# Patient Record
Sex: MALE | Race: WHITE | NOT HISPANIC OR LATINO | Employment: FULL TIME | URBAN - METROPOLITAN AREA
[De-identification: names, ages, dates, MRNs, and addresses within clinical notes are randomized per-mention and may not be internally consistent; named-entity substitution may affect disease eponyms.]

---

## 2024-03-15 ENCOUNTER — LAB (OUTPATIENT)
Dept: LAB | Facility: LAB | Age: 44
End: 2024-03-15
Payer: COMMERCIAL

## 2024-03-15 ENCOUNTER — OFFICE VISIT (OUTPATIENT)
Dept: PRIMARY CARE | Facility: CLINIC | Age: 44
End: 2024-03-15
Payer: COMMERCIAL

## 2024-03-15 VITALS
HEIGHT: 73 IN | SYSTOLIC BLOOD PRESSURE: 152 MMHG | BODY MASS INDEX: 30.29 KG/M2 | DIASTOLIC BLOOD PRESSURE: 91 MMHG | OXYGEN SATURATION: 98 % | HEART RATE: 62 BPM

## 2024-03-15 DIAGNOSIS — Z00.00 ROUTINE GENERAL MEDICAL EXAMINATION AT A HEALTH CARE FACILITY: ICD-10-CM

## 2024-03-15 DIAGNOSIS — Z12.11 COLON CANCER SCREENING: Primary | ICD-10-CM

## 2024-03-15 LAB
25(OH)D3 SERPL-MCNC: 16 NG/ML (ref 30–100)
ALBUMIN SERPL BCP-MCNC: 4.4 G/DL (ref 3.4–5)
ALP SERPL-CCNC: 38 U/L (ref 33–120)
ALT SERPL W P-5'-P-CCNC: 54 U/L (ref 10–52)
ANION GAP SERPL CALC-SCNC: 11 MMOL/L (ref 10–20)
AST SERPL W P-5'-P-CCNC: 42 U/L (ref 9–39)
BASOPHILS # BLD AUTO: 0.05 X10*3/UL (ref 0–0.1)
BASOPHILS NFR BLD AUTO: 0.8 %
BILIRUB SERPL-MCNC: 0.4 MG/DL (ref 0–1.2)
BUN SERPL-MCNC: 19 MG/DL (ref 6–23)
CALCIUM SERPL-MCNC: 9.7 MG/DL (ref 8.6–10.6)
CHLORIDE SERPL-SCNC: 103 MMOL/L (ref 98–107)
CHOLEST SERPL-MCNC: 177 MG/DL (ref 0–199)
CHOLESTEROL/HDL RATIO: 4.1
CO2 SERPL-SCNC: 27 MMOL/L (ref 21–32)
CREAT SERPL-MCNC: 0.8 MG/DL (ref 0.5–1.3)
EGFRCR SERPLBLD CKD-EPI 2021: >90 ML/MIN/1.73M*2
EOSINOPHIL # BLD AUTO: 0.17 X10*3/UL (ref 0–0.7)
EOSINOPHIL NFR BLD AUTO: 2.9 %
ERYTHROCYTE [DISTWIDTH] IN BLOOD BY AUTOMATED COUNT: 13.2 % (ref 11.5–14.5)
EST. AVERAGE GLUCOSE BLD GHB EST-MCNC: 105 MG/DL
GLUCOSE SERPL-MCNC: 92 MG/DL (ref 74–99)
HBA1C MFR BLD: 5.3 %
HCT VFR BLD AUTO: 46.2 % (ref 41–52)
HDLC SERPL-MCNC: 43.7 MG/DL
HGB BLD-MCNC: 15.6 G/DL (ref 13.5–17.5)
IMM GRANULOCYTES # BLD AUTO: 0.02 X10*3/UL (ref 0–0.7)
IMM GRANULOCYTES NFR BLD AUTO: 0.3 % (ref 0–0.9)
LDLC SERPL CALC-MCNC: 83 MG/DL
LYMPHOCYTES # BLD AUTO: 2.02 X10*3/UL (ref 1.2–4.8)
LYMPHOCYTES NFR BLD AUTO: 34.1 %
MCH RBC QN AUTO: 30.6 PG (ref 26–34)
MCHC RBC AUTO-ENTMCNC: 33.8 G/DL (ref 32–36)
MCV RBC AUTO: 91 FL (ref 80–100)
MONOCYTES # BLD AUTO: 0.55 X10*3/UL (ref 0.1–1)
MONOCYTES NFR BLD AUTO: 9.3 %
NEUTROPHILS # BLD AUTO: 3.11 X10*3/UL (ref 1.2–7.7)
NEUTROPHILS NFR BLD AUTO: 52.6 %
NON HDL CHOLESTEROL: 133 MG/DL (ref 0–149)
NRBC BLD-RTO: 0 /100 WBCS (ref 0–0)
PLATELET # BLD AUTO: 240 X10*3/UL (ref 150–450)
POTASSIUM SERPL-SCNC: 3.9 MMOL/L (ref 3.5–5.3)
PROT SERPL-MCNC: 6.9 G/DL (ref 6.4–8.2)
RBC # BLD AUTO: 5.1 X10*6/UL (ref 4.5–5.9)
SODIUM SERPL-SCNC: 137 MMOL/L (ref 136–145)
TRIGL SERPL-MCNC: 250 MG/DL (ref 0–149)
TSH SERPL-ACNC: 0.93 MIU/L (ref 0.44–3.98)
VLDL: 50 MG/DL (ref 0–40)
WBC # BLD AUTO: 5.9 X10*3/UL (ref 4.4–11.3)

## 2024-03-15 PROCEDURE — 85025 COMPLETE CBC W/AUTO DIFF WBC: CPT

## 2024-03-15 PROCEDURE — 84153 ASSAY OF PSA TOTAL: CPT

## 2024-03-15 PROCEDURE — 82306 VITAMIN D 25 HYDROXY: CPT

## 2024-03-15 PROCEDURE — 83036 HEMOGLOBIN GLYCOSYLATED A1C: CPT

## 2024-03-15 PROCEDURE — 84443 ASSAY THYROID STIM HORMONE: CPT

## 2024-03-15 PROCEDURE — 84154 ASSAY OF PSA FREE: CPT

## 2024-03-15 PROCEDURE — 80053 COMPREHEN METABOLIC PANEL: CPT

## 2024-03-15 PROCEDURE — 80061 LIPID PANEL: CPT

## 2024-03-15 PROCEDURE — 36415 COLL VENOUS BLD VENIPUNCTURE: CPT

## 2024-03-15 PROCEDURE — 99396 PREV VISIT EST AGE 40-64: CPT | Performed by: INTERNAL MEDICINE

## 2024-03-15 NOTE — PROGRESS NOTES
Andrew Berg is a 44 yo M presenting for wellness visit.     PreHTN, elevated BP today - urged home BP checking. Suspect may have HTN. Likely new start antihypertensive pending home readings.   DLD, low ASCVD   Overweight   L hydrocele s/p repair       #HM   -cscope due   -due labs       Gen Aox3 NAD well appearing   HEENT mmm pharynx clear no cervical LAD   Eyes sclerae clear   CV rrr nl s1/2 no m/r/g  Pulm CTAB no adventitious sounds   Ext warm dry no edema 2+ DP pulse         Andrew Berg is a 44 yo M presenting for wellness visit.     PreHTN, elevated BP today - urged home BP checking. Suspect may have HTN. Likely new start antihypertensive pending home readings.   DLD, low ASCVD   Overweight   L hydrocele s/p repair       #HM   -cscope due   -due labs

## 2024-03-15 NOTE — PATIENT INSTRUCTIONS
Andrew,     We are doing full labs today   You will be called to schedule colonoscopy this summer. Ask for Clenpiq prep!   Check your blood pressure a couple times a week in the morning; if consistently 135+ ont he top or 85+ on the bottom, call or MyChart message me (registration link on this handout!) and we would start an easy once a day blood pressure pill one tab daily at bedtime - not a big deal. Give me a message in 1-2 weeks.     CL

## 2024-03-18 LAB
PSA FREE MFR SERPL: 67 %
PSA FREE SERPL-MCNC: 0.2 NG/ML
PSA SERPL IA-MCNC: 0.3 NG/ML (ref 0–4)

## 2024-04-16 DIAGNOSIS — I10 BENIGN ESSENTIAL HYPERTENSION: Primary | ICD-10-CM

## 2024-04-16 RX ORDER — LOSARTAN POTASSIUM 50 MG/1
50 TABLET ORAL DAILY
Qty: 30 TABLET | Refills: 0 | Status: SHIPPED | OUTPATIENT
Start: 2024-04-16 | End: 2024-05-13 | Stop reason: SDUPTHER

## 2024-05-12 NOTE — PROGRESS NOTES
Subjective   Patient ID: Brian Berg is a 43 y.o. male who presents for FUV.  HPI    Patient reports that he has been feeling well since his last visit.  He is started taking his losartan 50 mg a day, and has been taking it every day.  He has not noticed any side effects.  He is experienced any headaches, vision changes (apart from new prescription), chest pain, palpitations, edema or other concerns.  His blood pressure home cuff is not working, but he will try to replace it.  He follows a vegetarian diet, and DASH diet was discussed.  He is very active and runs a farm.  He does not have any other concerns at this time.  PMH:   Past Medical History:   Diagnosis Date    Other recurrent depressive disorders (CMS-[facility]) 12/02/2014    Seasonal affective disorder      PSH:   Past Surgical History:   Procedure Laterality Date    OTHER SURGICAL HISTORY  12/02/2014    Closed Treat Fractured Finger Middle Phalanx, Manipulation      Allergies: Not on File   FH: family history is not on file.   Social Hx:   Social Determinants of Health     Tobacco Use: Not on file   Alcohol Use: Not on file   Financial Resource Strain: Not on file   Food Insecurity: Not on file   Transportation Needs: Not on file   Physical Activity: Not on file   Stress: Not on file   Social Connections: Not on file   Intimate Partner Violence: Not on file   Depression: Not on file   Housing Stability: Not on file   Utilities: Not on file   Digital Equity: Not on file   Health Literacy: Not on file        Review of Systems: All other ROS negative except as above.    Objective   Visit Vitals  /82   Pulse 80   Resp 20      Physical Exam  Constitutional: awake and alert, resting comfortably in NAD, alert and oriented x3  Eyes: No scleral icterus or conjunctival injection, EOM grossly intact  ENMT: MMM  Head/Neck: NC/AT  Respiratory/Thorax: Breathing comfortably. No retractions or accessory muscle use. Good air entry into all lung fields. CTAB, no  "wheezes, rales, rhonchi or crackles  Cardiovascular: RRR, +S1, S2, no m/r/g  Extremities: warm and well perfused, no LE edema, 2+ radial and dorsalis pedis pulses b/l, capillary refill <2s  Neurological: motor and sensation grossly intact and symmetric  Psychological: Mood and affect appropriate for age     Current Outpatient Medications   Medication Instructions    losartan (COZAAR) 50 mg, oral, Daily         No lab exists for component: \"CBC\", \"RFP\", \"CMP\", \"VITAMIN D\"     Assessment/Plan   Problem List Items Addressed This Visit    None      Brian Berg is a 43 y.o. male who presents for FUV and BP check. His BP has significantly improved from 150s/90s to 128/82 today on losartan. He is tolerating medication well with no significant side effects. He was encouraged to continue with adherence, and additional measures including DASH diet were discussed.    PreHTN, elevated BP  -c/w Losartan 50mg daily  -encouraged to obtain new home BP cuff and monitor  -DASH diet  -remain active    DLD, low ASCVD   Overweight   L hydrocele s/p repair         #HM   -cscope due   -due labs     The patient was seen and discussed with Attending Physician, Dr. Sim.    Priyanka Marquis MD, MPH  Internal Medicine-Pediatrics, PGY-2  [facility]  [facility]  Please note that voice recognition software was used to dictate this note. Please excuse any errors.   "

## 2024-05-13 ENCOUNTER — OFFICE VISIT (OUTPATIENT)
Dept: PRIMARY CARE | Facility: CLINIC | Age: 44
End: 2024-05-13
Payer: COMMERCIAL

## 2024-05-13 VITALS — HEART RATE: 80 BPM | DIASTOLIC BLOOD PRESSURE: 82 MMHG | RESPIRATION RATE: 20 BRPM | SYSTOLIC BLOOD PRESSURE: 128 MMHG

## 2024-05-13 DIAGNOSIS — L20.9 ATOPIC DERMATITIS, UNSPECIFIED TYPE: Primary | ICD-10-CM

## 2024-05-13 DIAGNOSIS — I10 BENIGN ESSENTIAL HYPERTENSION: ICD-10-CM

## 2024-05-13 PROCEDURE — 3074F SYST BP LT 130 MM HG: CPT | Performed by: INTERNAL MEDICINE

## 2024-05-13 PROCEDURE — 99213 OFFICE O/P EST LOW 20 MIN: CPT | Performed by: INTERNAL MEDICINE

## 2024-05-13 PROCEDURE — 3079F DIAST BP 80-89 MM HG: CPT | Performed by: INTERNAL MEDICINE

## 2024-05-13 RX ORDER — LOSARTAN POTASSIUM 50 MG/1
50 TABLET ORAL DAILY
Qty: 90 TABLET | Refills: 3 | Status: SHIPPED | OUTPATIENT
Start: 2024-05-13 | End: 2025-05-13

## 2024-05-13 RX ORDER — CLOBETASOL PROPIONATE 0.5 MG/G
CREAM TOPICAL 2 TIMES DAILY
Qty: 60 G | Refills: 2 | Status: SHIPPED | OUTPATIENT
Start: 2024-05-13 | End: 2025-05-13

## 2024-06-19 ENCOUNTER — HOSPITAL ENCOUNTER (OUTPATIENT)
Dept: GASTROENTEROLOGY | Facility: HOSPITAL | Age: 44
Setting detail: OUTPATIENT SURGERY
Discharge: HOME | End: 2024-06-19
Payer: COMMERCIAL

## 2024-06-19 VITALS
DIASTOLIC BLOOD PRESSURE: 81 MMHG | HEART RATE: 60 BPM | WEIGHT: 225 LBS | BODY MASS INDEX: 29.82 KG/M2 | SYSTOLIC BLOOD PRESSURE: 117 MMHG | RESPIRATION RATE: 18 BRPM | OXYGEN SATURATION: 97 % | TEMPERATURE: 98 F | HEIGHT: 73 IN

## 2024-06-19 DIAGNOSIS — Z12.11 COLON CANCER SCREENING: Primary | ICD-10-CM

## 2024-06-19 PROCEDURE — 45378 DIAGNOSTIC COLONOSCOPY: CPT | Performed by: STUDENT IN AN ORGANIZED HEALTH CARE EDUCATION/TRAINING PROGRAM

## 2024-06-19 PROCEDURE — 7100000009 HC PHASE TWO TIME - INITIAL BASE CHARGE

## 2024-06-19 PROCEDURE — 99152 MOD SED SAME PHYS/QHP 5/>YRS: CPT | Performed by: STUDENT IN AN ORGANIZED HEALTH CARE EDUCATION/TRAINING PROGRAM

## 2024-06-19 PROCEDURE — 2500000004 HC RX 250 GENERAL PHARMACY W/ HCPCS (ALT 636 FOR OP/ED): Performed by: STUDENT IN AN ORGANIZED HEALTH CARE EDUCATION/TRAINING PROGRAM

## 2024-06-19 PROCEDURE — 3700000012 HC SEDATION LEVEL 5+ TIME - INITIAL 15 MINUTES 5/> YEARS

## 2024-06-19 PROCEDURE — 7100000010 HC PHASE TWO TIME - EACH INCREMENTAL 1 MINUTE

## 2024-06-19 RX ORDER — FENTANYL CITRATE 50 UG/ML
INJECTION, SOLUTION INTRAMUSCULAR; INTRAVENOUS AS NEEDED
Status: COMPLETED | OUTPATIENT
Start: 2024-06-19 | End: 2024-06-19

## 2024-06-19 RX ORDER — MIDAZOLAM HYDROCHLORIDE 1 MG/ML
INJECTION, SOLUTION INTRAMUSCULAR; INTRAVENOUS AS NEEDED
Status: COMPLETED | OUTPATIENT
Start: 2024-06-19 | End: 2024-06-19

## 2024-06-19 RX ORDER — SODIUM CHLORIDE, SODIUM LACTATE, POTASSIUM CHLORIDE, CALCIUM CHLORIDE 600; 310; 30; 20 MG/100ML; MG/100ML; MG/100ML; MG/100ML
20 INJECTION, SOLUTION INTRAVENOUS CONTINUOUS
Status: DISCONTINUED | OUTPATIENT
Start: 2024-06-19 | End: 2024-06-20 | Stop reason: HOSPADM

## 2024-06-19 ASSESSMENT — PAIN SCALES - GENERAL
PAINLEVEL_OUTOF10: 0 - NO PAIN

## 2024-06-19 ASSESSMENT — PAIN - FUNCTIONAL ASSESSMENT
PAIN_FUNCTIONAL_ASSESSMENT: 0-10

## 2024-06-19 ASSESSMENT — ENCOUNTER SYMPTOMS
DEPRESSION: 0
LOSS OF SENSATION IN FEET: 0
OCCASIONAL FEELINGS OF UNSTEADINESS: 0

## 2024-06-19 ASSESSMENT — COLUMBIA-SUICIDE SEVERITY RATING SCALE - C-SSRS
6. HAVE YOU EVER DONE ANYTHING, STARTED TO DO ANYTHING, OR PREPARED TO DO ANYTHING TO END YOUR LIFE?: NO
1. IN THE PAST MONTH, HAVE YOU WISHED YOU WERE DEAD OR WISHED YOU COULD GO TO SLEEP AND NOT WAKE UP?: NO
2. HAVE YOU ACTUALLY HAD ANY THOUGHTS OF KILLING YOURSELF?: NO

## 2024-06-19 NOTE — H&P
"History Of Present Illness  Brian Berg is a 44 y.o. male presenting for a colonoscopy for CRC screening.      Past Medical History  Past Medical History:   Diagnosis Date    Hypertension     Other recurrent depressive disorders (CMS-[facility]) 12/02/2014    Seasonal affective disorder     Surgical History  Past Surgical History:   Procedure Laterality Date    OTHER SURGICAL HISTORY  12/02/2014    Closed Treat Fractured Finger Middle Phalanx, Manipulation     Social History  He reports that he has never smoked. He has never used smokeless tobacco. He reports current alcohol use. He reports current drug use. Drug: Marijuana.    Family History  No family history on file.     Allergies  Allergies   Allergen Reactions    Codeine Nausea/vomiting     Review of Systems  Constitutional/ General: No fever  Eyes: no yellow discoloration  ENT: normal   Cardiovascular: no chest pain   Respiratory: no shortness of breath  Gastrointestinal: denies abdominal pain   Integumentary: no yellow discoloration of skin  Neurologic: no weakness of extremities  Genitourinary: no hematuria    All other systems have been reviewed and are negative except as noted in the HPI and above.    Pre-sedation Evaluation:  ASA Classification - ASA 2 - Patient with mild systemic disease with no functional limitations  Mallampati Score - II (hard and soft palate, upper portion of tonsils and uvula visible)    Physical Exam  Constitutional: awake, alert, in no acute distress  Eyes: EOMI   ENT: no oropharyngeal lesions visualized  Respiratory: Bilateral air entry equal   Cardiovascular: regular rate and rhythm, no lower extremity edema  Abdomen: soft, non tender, non distended    Neurologic: gross motor strength intact   Psychiatric: alert, appropriate mood and affect, oriented to time/place/person    Last Recorded Vitals  Height 1.854 m (6' 1\"), weight 102 kg (225 lb).     Assessment/Plan   Plan for colonoscopy today. Will review procedure details, potential " risks and obtain informed consent.        PTA/Current Medications:  (Not in a hospital admission)    Current Outpatient Medications   Medication Sig Dispense Refill    clobetasol (Temovate) 0.05 % cream Apply topically 2 times a day. 60 g 2    losartan (Cozaar) 50 mg tablet Take 1 tablet (50 mg) by mouth once daily. 90 tablet 3     Current Facility-Administered Medications   Medication Dose Route Frequency Provider Last Rate Last Admin    lactated Ringer's infusion  20 mL/hr intravenous Continuous MD Zuly Lorenzo MD

## 2024-06-20 ASSESSMENT — PAIN SCALES - GENERAL: PAINLEVEL_OUTOF10: 0 - NO PAIN

## 2024-12-12 ENCOUNTER — APPOINTMENT (OUTPATIENT)
Dept: PRIMARY CARE | Facility: CLINIC | Age: 44
End: 2024-12-12
Payer: COMMERCIAL

## 2024-12-12 ENCOUNTER — LAB (OUTPATIENT)
Dept: LAB | Facility: LAB | Age: 44
End: 2024-12-12
Payer: COMMERCIAL

## 2024-12-12 VITALS — SYSTOLIC BLOOD PRESSURE: 145 MMHG | BODY MASS INDEX: 30.74 KG/M2 | WEIGHT: 233 LBS | DIASTOLIC BLOOD PRESSURE: 80 MMHG

## 2024-12-12 DIAGNOSIS — L20.9 ATOPIC DERMATITIS, UNSPECIFIED TYPE: ICD-10-CM

## 2024-12-12 DIAGNOSIS — Z00.00 HEALTH CARE MAINTENANCE: Primary | ICD-10-CM

## 2024-12-12 DIAGNOSIS — I10 BENIGN ESSENTIAL HYPERTENSION: ICD-10-CM

## 2024-12-12 DIAGNOSIS — Z00.00 HEALTH CARE MAINTENANCE: ICD-10-CM

## 2024-12-12 LAB
ALBUMIN SERPL BCP-MCNC: 4.2 G/DL (ref 3.4–5)
ALP SERPL-CCNC: 41 U/L (ref 33–120)
ALT SERPL W P-5'-P-CCNC: 39 U/L (ref 10–52)
ANION GAP SERPL CALC-SCNC: 11 MMOL/L (ref 10–20)
AST SERPL W P-5'-P-CCNC: 26 U/L (ref 9–39)
BILIRUB SERPL-MCNC: 0.5 MG/DL (ref 0–1.2)
BUN SERPL-MCNC: 24 MG/DL (ref 6–23)
CALCIUM SERPL-MCNC: 9.9 MG/DL (ref 8.6–10.6)
CHLORIDE SERPL-SCNC: 104 MMOL/L (ref 98–107)
CHOLEST SERPL-MCNC: 155 MG/DL (ref 0–199)
CHOLESTEROL/HDL RATIO: 3.5
CO2 SERPL-SCNC: 27 MMOL/L (ref 21–32)
CREAT SERPL-MCNC: 0.85 MG/DL (ref 0.5–1.3)
EGFRCR SERPLBLD CKD-EPI 2021: >90 ML/MIN/1.73M*2
ERYTHROCYTE [DISTWIDTH] IN BLOOD BY AUTOMATED COUNT: 13.2 % (ref 11.5–14.5)
GLUCOSE SERPL-MCNC: 96 MG/DL (ref 74–99)
HCT VFR BLD AUTO: 45.6 % (ref 41–52)
HDLC SERPL-MCNC: 43.8 MG/DL
HGB BLD-MCNC: 15.1 G/DL (ref 13.5–17.5)
LDLC SERPL CALC-MCNC: 73 MG/DL
MCH RBC QN AUTO: 30 PG (ref 26–34)
MCHC RBC AUTO-ENTMCNC: 33.1 G/DL (ref 32–36)
MCV RBC AUTO: 91 FL (ref 80–100)
NON HDL CHOLESTEROL: 111 MG/DL (ref 0–149)
NRBC BLD-RTO: 0 /100 WBCS (ref 0–0)
PLATELET # BLD AUTO: 277 X10*3/UL (ref 150–450)
POTASSIUM SERPL-SCNC: 4.2 MMOL/L (ref 3.5–5.3)
PROT SERPL-MCNC: 7.2 G/DL (ref 6.4–8.2)
RBC # BLD AUTO: 5.03 X10*6/UL (ref 4.5–5.9)
SODIUM SERPL-SCNC: 138 MMOL/L (ref 136–145)
TRIGL SERPL-MCNC: 192 MG/DL (ref 0–149)
VLDL: 38 MG/DL (ref 0–40)
WBC # BLD AUTO: 7.3 X10*3/UL (ref 4.4–11.3)

## 2024-12-12 PROCEDURE — 85027 COMPLETE CBC AUTOMATED: CPT

## 2024-12-12 PROCEDURE — 3077F SYST BP >= 140 MM HG: CPT | Performed by: INTERNAL MEDICINE

## 2024-12-12 PROCEDURE — 3079F DIAST BP 80-89 MM HG: CPT | Performed by: INTERNAL MEDICINE

## 2024-12-12 PROCEDURE — 80061 LIPID PANEL: CPT

## 2024-12-12 PROCEDURE — 80053 COMPREHEN METABOLIC PANEL: CPT

## 2024-12-12 PROCEDURE — 36415 COLL VENOUS BLD VENIPUNCTURE: CPT

## 2024-12-12 PROCEDURE — 99396 PREV VISIT EST AGE 40-64: CPT | Performed by: INTERNAL MEDICINE

## 2024-12-12 RX ORDER — LOSARTAN POTASSIUM 100 MG/1
100 TABLET ORAL DAILY
Qty: 100 TABLET | Refills: 1 | Status: SHIPPED | OUTPATIENT
Start: 2024-12-12 | End: 2026-01-16

## 2024-12-12 NOTE — PROGRESS NOTES
Subjective   Patient ID: Brian Berg is a 44 y.o. male who presents for No chief complaint on file..    HPI patient for first time septated Franchi 8 former Dr. High patient home blood pressures have been crusting in the 150s to 160 gio the diastolic numbers have been in the mid 70s to 80 gio no clinical symptoms no chest pain no shortness of breath no leg swelling no palpitations bowels normal no dysuria not exercising much but is walking    Past medical history hypertension atopic dermatitis    Medications losartan 50 mg steroid cream    Allergies noted and unchanged see EMR    Social history essentially no tobacco alcohol 10-15 drinks per week caffeine 3 to 5/day    2 father ASCVD TIA hypertension hyperlipidemia    Prevention walks for exercise some prior blood work reviewed weight has been stable  Review of Systems    Objective   There were no vitals taken for this visit.    Physical Exam  Vital signs noted alert and oriented x 3 NCAT perrla eomi nares s dc op benign no ac nodes no thyromegaly no JVD or bruit chest clear to auscultation and percussion CV regular rate and rhythm S1-S2 without murmur gallop or rub abd soft nont nabs no r/g ls spin nextremities no clubbing cyanosis or edema normal distal pulses  Assessment/Plan     Imaging general medical examination hypertension other diagnoses  Plan check comprehensive panel advised on glucose potassium and kidney function as well as liver and cut down on drinking cut down on caffeine does not use much in the way of NSAIDs or cold products increase the losartan to 100 mg p.o. daily see EMR watch salt in diet good water consumption check CBC advised on blood count check lipid panel advised on cholesterol profile advised on overall cardiovascular review prior laboratory results and recheck 3 months based on above  [id number]

## 2025-07-24 ENCOUNTER — APPOINTMENT (OUTPATIENT)
Dept: PRIMARY CARE | Facility: CLINIC | Age: 45
End: 2025-07-24
Payer: COMMERCIAL

## 2025-07-24 VITALS
DIASTOLIC BLOOD PRESSURE: 72 MMHG | OXYGEN SATURATION: 98 % | HEIGHT: 73 IN | TEMPERATURE: 97 F | HEART RATE: 70 BPM | BODY MASS INDEX: 31.28 KG/M2 | WEIGHT: 236 LBS | RESPIRATION RATE: 12 BRPM | SYSTOLIC BLOOD PRESSURE: 118 MMHG

## 2025-07-24 DIAGNOSIS — B35.6 TINEA CRURIS: ICD-10-CM

## 2025-07-24 DIAGNOSIS — I10 BENIGN ESSENTIAL HYPERTENSION: Primary | ICD-10-CM

## 2025-07-24 DIAGNOSIS — Z00.00 HEALTH CARE MAINTENANCE: ICD-10-CM

## 2025-07-24 PROBLEM — N50.89 TESTICULAR MASS: Status: ACTIVE | Noted: 2025-07-24

## 2025-07-24 PROBLEM — N50.9 DISORDER OF MALE GENITAL ORGANS: Status: ACTIVE | Noted: 2025-07-24

## 2025-07-24 PROBLEM — L30.9 DERMATITIS, UNSPECIFIED: Status: ACTIVE | Noted: 2021-10-08

## 2025-07-24 PROCEDURE — 99213 OFFICE O/P EST LOW 20 MIN: CPT | Performed by: INTERNAL MEDICINE

## 2025-07-24 PROCEDURE — 3078F DIAST BP <80 MM HG: CPT | Performed by: INTERNAL MEDICINE

## 2025-07-24 PROCEDURE — 3074F SYST BP LT 130 MM HG: CPT | Performed by: INTERNAL MEDICINE

## 2025-07-24 PROCEDURE — 3008F BODY MASS INDEX DOCD: CPT | Performed by: INTERNAL MEDICINE

## 2025-07-24 RX ORDER — LOSARTAN POTASSIUM 100 MG/1
100 TABLET ORAL DAILY
Qty: 100 TABLET | Refills: 1 | Status: SHIPPED | OUTPATIENT
Start: 2025-07-24 | End: 2026-08-28

## 2025-07-24 RX ORDER — TRIAMCINOLONE ACETONIDE 1 MG/G
1 CREAM TOPICAL
COMMUNITY
Start: 2021-07-29

## 2025-07-24 RX ORDER — MELOXICAM 15 MG/1
1 TABLET ORAL DAILY
COMMUNITY
Start: 2019-12-24

## 2025-07-24 ASSESSMENT — ANXIETY QUESTIONNAIRES
2. NOT BEING ABLE TO STOP OR CONTROL WORRYING: NOT AT ALL
5. BEING SO RESTLESS THAT IT IS HARD TO SIT STILL: NOT AT ALL
GAD7 TOTAL SCORE: 0
4. TROUBLE RELAXING: NOT AT ALL
6. BECOMING EASILY ANNOYED OR IRRITABLE: NOT AT ALL
3. WORRYING TOO MUCH ABOUT DIFFERENT THINGS: NOT AT ALL
1. FEELING NERVOUS, ANXIOUS, OR ON EDGE: NOT AT ALL
7. FEELING AFRAID AS IF SOMETHING AWFUL MIGHT HAPPEN: NOT AT ALL

## 2025-07-24 NOTE — PROGRESS NOTES
"Follow-up visit no chest pain no shortness of breath no side effect with medication [place] attending the garden at [place] Subjective   Patient ID: Biran Berg is a 45 y.o. male who presents for Annual Exam.    HPI so Works as aerobically but does not do quite the exercise that he used to do send prior blood work reviewed also with some irritation in the rectum as history of hemorrhoids    Review of Systems    Objective   Pulse 70   Temp 36.1 °C (97 °F)   Resp 12   Ht 1.854 m (6' 1\")   Wt 107 kg (236 lb)   SpO2 98%   BMI 31.14 kg/m²   Vital signs noted alert and oriented x 3 NCAT no JVD chest clear to auscultation cor regular rate and rhythm S1-S2 abdomen soft nontender normal active bowel sounds rectum there is extensive tinea cruris at the rectum there is no hemorrhoid extremities no clubbing cyanosis or edema normal distal pulses  Physical Exam    Assessment/Plan impression hypertension tinea cruris other diagnoses  Plan father being evaluated for Parkinson disease  He has done well to help avoid cardiovascular by having good blood pressure cholesterol does not smoke does not have diabetes May increase aerobic exercise continue with the blood pressure medicine refill see EMR review the blood work with him may watch cho and saturated fats may use Lamisil or Lotrimin for the rectal area and recheck if no better and follow-up for regular physical examination and blood work in the winter 2025 or sooner as needed           "

## 2025-12-29 ENCOUNTER — APPOINTMENT (OUTPATIENT)
Dept: PRIMARY CARE | Facility: CLINIC | Age: 45
End: 2025-12-29
Payer: COMMERCIAL

## 2026-07-23 ENCOUNTER — APPOINTMENT (OUTPATIENT)
Dept: PRIMARY CARE | Facility: CLINIC | Age: 46
End: 2026-07-23
Payer: COMMERCIAL